# Patient Record
Sex: MALE | Race: ASIAN | NOT HISPANIC OR LATINO | Employment: OTHER | URBAN - METROPOLITAN AREA
[De-identification: names, ages, dates, MRNs, and addresses within clinical notes are randomized per-mention and may not be internally consistent; named-entity substitution may affect disease eponyms.]

---

## 2020-04-30 ENCOUNTER — OFFICE VISIT (OUTPATIENT)
Dept: URGENT CARE | Facility: CLINIC | Age: 70
End: 2020-04-30

## 2020-04-30 ENCOUNTER — HOSPITAL ENCOUNTER (EMERGENCY)
Facility: MEDICAL CENTER | Age: 70
End: 2020-04-30

## 2020-04-30 VITALS
TEMPERATURE: 98.1 F | OXYGEN SATURATION: 95 % | BODY MASS INDEX: 21.63 KG/M2 | RESPIRATION RATE: 16 BRPM | HEART RATE: 75 BPM | SYSTOLIC BLOOD PRESSURE: 162 MMHG | WEIGHT: 137.79 LBS | DIASTOLIC BLOOD PRESSURE: 105 MMHG

## 2020-04-30 VITALS
WEIGHT: 130 LBS | BODY MASS INDEX: 20.4 KG/M2 | HEIGHT: 67 IN | SYSTOLIC BLOOD PRESSURE: 154 MMHG | HEART RATE: 68 BPM | RESPIRATION RATE: 16 BRPM | TEMPERATURE: 98 F | OXYGEN SATURATION: 98 % | DIASTOLIC BLOOD PRESSURE: 78 MMHG

## 2020-04-30 DIAGNOSIS — Z87.891 HISTORY OF TOBACCO USE: ICD-10-CM

## 2020-04-30 DIAGNOSIS — R42 DIZZINESS: ICD-10-CM

## 2020-04-30 DIAGNOSIS — R94.31 ABNORMAL EKG: ICD-10-CM

## 2020-04-30 PROCEDURE — 302449 STATCHG TRIAGE ONLY (STATISTIC)

## 2020-04-30 PROCEDURE — 93000 ELECTROCARDIOGRAM COMPLETE: CPT | Performed by: PHYSICIAN ASSISTANT

## 2020-04-30 PROCEDURE — 99203 OFFICE O/P NEW LOW 30 MIN: CPT | Performed by: PHYSICIAN ASSISTANT

## 2020-04-30 ASSESSMENT — ENCOUNTER SYMPTOMS
NECK PAIN: 0
NAUSEA: 1
DIARRHEA: 0
CHILLS: 0
SHORTNESS OF BREATH: 0
MYALGIAS: 0
SPUTUM PRODUCTION: 0
TINGLING: 0
VOMITING: 0
PALPITATIONS: 0
COUGH: 0
FOCAL WEAKNESS: 0
SPEECH CHANGE: 0
HEADACHES: 0
PHOTOPHOBIA: 0
BLURRED VISION: 0
ABDOMINAL PAIN: 0
DOUBLE VISION: 0
FEVER: 0
SENSORY CHANGE: 0
DIZZINESS: 1

## 2020-04-30 NOTE — ED NOTES
This RN utilized the  to assess pt. Pt refused to get into a gown or be placed on vs monitoring devices. Pt also refusing IV/lab draw. This rn explained what was going to happen during his ER stay. Pt states he feels better and does not want anything done. He wants to go home. The  advised pt that due to his complaints, he should stay. Pt again refused. This rn ambulated with pt to er waiting room where pts wishes were discussed with his son-in-law. All questions asked/answered. This RN advised son-in-law/pt to come back to the ER if s/s return or he would like care provided.

## 2020-04-30 NOTE — PROGRESS NOTES
"Subjective:      Dayanna Hayes is a 69 y.o. male who presents with Dizziness (woke up today dizzy \"room spinning\" ) and Nausea            HPI  69-year-old male presents to urgent care with new problem of dizziness, nausea and diaphoresis noted this morning upon awakening.  Dizziness is described as the room is spinning.    Denies chest pain, shortness of breath, headache, visual acuity changes, lightheadedness, or unilateral weakness.  History of hypertension, took half dose of BP medications this morning.   Denies history of similar.  No recent URI.  Denies history of seasonal allergies.    Review of Systems   Constitutional: Negative for chills, fever and malaise/fatigue.   HENT: Negative for tinnitus.    Eyes: Negative for blurred vision, double vision and photophobia.   Respiratory: Negative for cough, sputum production and shortness of breath.    Cardiovascular: Negative for chest pain, palpitations and leg swelling.   Gastrointestinal: Positive for nausea. Negative for abdominal pain, diarrhea and vomiting.   Musculoskeletal: Negative for myalgias and neck pain.   Neurological: Positive for dizziness. Negative for tingling, sensory change, speech change, focal weakness and headaches.   Endo/Heme/Allergies: Negative for environmental allergies.     Past Medical History:   Diagnosis Date   • Hypertension      Medications and allergies reviewed in epic.  Social History     Tobacco Use   • Smoking status: Current Every Day Smoker     Packs/day: 0.10     Types: Cigarettes   • Smokeless tobacco: Never Used   Substance Use Topics   • Alcohol use: Not Currently      Objective:     /78   Pulse 68   Temp 36.7 °C (98 °F) (Temporal)   Resp 16   Ht 1.7 m (5' 6.93\")   Wt 59 kg (130 lb)   SpO2 98%   BMI 20.40 kg/m²      Physical Exam  Vitals signs reviewed.   Constitutional:       General: He is not in acute distress.     Appearance: Normal appearance. He is well-developed. He is not ill-appearing.   HENT:      " Head: Normocephalic and atraumatic.      Right Ear: Tympanic membrane normal.      Left Ear: Tympanic membrane normal.      Mouth/Throat:      Mouth: Mucous membranes are moist.      Pharynx: Oropharynx is clear.   Eyes:      Conjunctiva/sclera: Conjunctivae normal.   Neck:      Musculoskeletal: Normal range of motion and neck supple.   Cardiovascular:      Rate and Rhythm: Normal rate and regular rhythm.      Pulses: Normal pulses.      Heart sounds: Normal heart sounds. No murmur.   Pulmonary:      Effort: Pulmonary effort is normal. No respiratory distress.      Breath sounds: Normal breath sounds.   Musculoskeletal: Normal range of motion.   Skin:     General: Skin is warm and dry.      Coloration: Skin is not pale.   Neurological:      General: No focal deficit present.      Mental Status: He is alert and oriented to person, place, and time.      Cranial Nerves: No cranial nerve deficit.      Motor: No weakness.      Coordination: Coordination normal.      Gait: Gait normal.   Psychiatric:         Mood and Affect: Mood normal.         Behavior: Behavior normal.         Thought Content: Thought content normal.         Judgment: Judgment normal.                 Assessment/Plan:     1. Dizziness  EKG - Clinic Performed    CANCELED: DX-CHEST-2 VIEWS   2. History of tobacco use  CANCELED: DX-CHEST-2 VIEWS   3. Abnormal EKG       EKG: Sinus rhythm at rate of 68. Normal R progression. Concerning for incomplete LBBB.   No old EKG for comparison.     Recommend patient proceed to emergency department for further evaluation and medical treatment plan.  Patient needs higher level of medical care.  Patient and son-in-law at bedside agree with treatment plan.  He will be transported per private vehicle to [facility] emergency department.  Spoke with charge nurse regarding patient transfer and patient's information given.  Patient is in no acute distress on discharge from urgent care.  Vital signs are stable.

## 2020-04-30 NOTE — ED TRIAGE NOTES
Chief Complaint   Patient presents with   • Vertigo     awoke this am with sx, lasted 10 min, most sx resolved at present

## 2020-08-19 ENCOUNTER — HOSPITAL ENCOUNTER (OUTPATIENT)
Dept: LAB | Facility: MEDICAL CENTER | Age: 70
End: 2020-08-19
Attending: FAMILY MEDICINE
Payer: OTHER GOVERNMENT

## 2020-08-19 LAB
COVID ORDER STATUS COVID19: NORMAL
SARS-COV-2 RNA RESP QL NAA+PROBE: NOTDETECTED
SPECIMEN SOURCE: NORMAL

## 2020-08-19 PROCEDURE — U0003 INFECTIOUS AGENT DETECTION BY NUCLEIC ACID (DNA OR RNA); SEVERE ACUTE RESPIRATORY SYNDROME CORONAVIRUS 2 (SARS-COV-2) (CORONAVIRUS DISEASE [COVID-19]), AMPLIFIED PROBE TECHNIQUE, MAKING USE OF HIGH THROUGHPUT TECHNOLOGIES AS DESCRIBED BY CMS-2020-01-R: HCPCS

## 2020-08-19 PROCEDURE — C9803 HOPD COVID-19 SPEC COLLECT: HCPCS

## 2021-03-03 DIAGNOSIS — Z23 NEED FOR VACCINATION: ICD-10-CM
